# Patient Record
Sex: MALE | Race: OTHER | HISPANIC OR LATINO | URBAN - METROPOLITAN AREA
[De-identification: names, ages, dates, MRNs, and addresses within clinical notes are randomized per-mention and may not be internally consistent; named-entity substitution may affect disease eponyms.]

---

## 2021-03-02 ENCOUNTER — EMERGENCY (EMERGENCY)
Facility: HOSPITAL | Age: 36
LOS: 1 days | Discharge: HOME | End: 2021-03-02
Attending: EMERGENCY MEDICINE
Payer: SELF-PAY

## 2021-03-02 VITALS
HEART RATE: 73 BPM | SYSTOLIC BLOOD PRESSURE: 131 MMHG | DIASTOLIC BLOOD PRESSURE: 89 MMHG | OXYGEN SATURATION: 99 % | RESPIRATION RATE: 18 BRPM | TEMPERATURE: 99 F

## 2021-03-02 LAB — ETHANOL SERPL-MCNC: 545 MG/DL — HIGH (ref 0–9)

## 2021-03-02 PROCEDURE — 36415 COLL VENOUS BLD VENIPUNCTURE: CPT

## 2021-03-02 PROCEDURE — 80307 DRUG TEST PRSMV CHEM ANLYZR: CPT

## 2021-03-02 PROCEDURE — 99284 EMERGENCY DEPT VISIT MOD MDM: CPT

## 2021-03-02 PROCEDURE — 99285 EMERGENCY DEPT VISIT HI MDM: CPT

## 2021-03-02 NOTE — ED ADULT NURSE NOTE - OBJECTIVE STATEMENT
Pt BIBA after being found sleeping outside by a dumpster. Smell of alcohol noted. Pt believed to be intoxicated, slurring speech and not providing much information. Pt responds to noxious stimuli.

## 2021-03-02 NOTE — ED PROVIDER NOTE - OBJECTIVE STATEMENT
Male found laying on the street corner with AMS, suspected alcohol intoxication. No report of or evidence of any trauma. Pt somnolent, arouses to noxious stimuli, provides no history.

## 2021-03-02 NOTE — ED ADULT TRIAGE NOTE - CHIEF COMPLAINT QUOTE
pt BIBA from laying around a dumpster on card board, odor of alcohol noted. pt only responsive to painful stimuli. pt not answering questions appropriately. dressed in yellow gown, belongings placed in belongings bag and locked in secure unit by S[facility]. dr. smith called to bedside for eval. FS 98 en route.

## 2021-03-02 NOTE — ED PROVIDER NOTE - ATTENDING CONTRIBUTION TO CARE
Alcohol intoxication observed until safe for dc. Ambulatory with steady gait, awake, alert, oriented and without any complaints upon dc.

## 2021-03-02 NOTE — ED PROVIDER NOTE - CLINICAL SUMMARY MEDICAL DECISION MAKING FREE TEXT BOX
40M found intoxicated in street, elevated ETOH level.  Exam remarkable for intoxication but no acute findings.  Patient gradually returned to sobriety and is now awake, alert, oriented x3, ambulating without issue, taking PO, not tremulous, denies any symptoms.  Denied offers to help with his alcohol problem.   Return precautions given.  Follow up with PCM.  All questions answered and patient agreeable to the plan.

## 2021-03-03 VITALS
DIASTOLIC BLOOD PRESSURE: 72 MMHG | HEART RATE: 86 BPM | OXYGEN SATURATION: 99 % | TEMPERATURE: 98 F | SYSTOLIC BLOOD PRESSURE: 112 MMHG | RESPIRATION RATE: 18 BRPM

## 2023-06-12 NOTE — ED PROVIDER NOTE - CPE EDP NEURO NORM
Detail Level: Zone
Show Cetaphil Line: Yes
Start Regimen: triamcinolone acetonide 0.1 % topical ointment Apply a thin layer to affected areas (on fingers) twice daily for 2 weeks on and 1 week off. Repeat as needed for flares.
Action 4: Continue
normal...